# Patient Record
Sex: FEMALE | Race: WHITE | NOT HISPANIC OR LATINO | Employment: UNEMPLOYED | URBAN - METROPOLITAN AREA
[De-identification: names, ages, dates, MRNs, and addresses within clinical notes are randomized per-mention and may not be internally consistent; named-entity substitution may affect disease eponyms.]

---

## 2017-09-20 ENCOUNTER — HOSPITAL ENCOUNTER (EMERGENCY)
Facility: HOSPITAL | Age: 37
Discharge: HOME | End: 2017-09-20
Attending: EMERGENCY MEDICINE
Payer: MEDICAID

## 2017-09-20 VITALS
HEART RATE: 73 BPM | OXYGEN SATURATION: 99 % | BODY MASS INDEX: 24.84 KG/M2 | HEIGHT: 62 IN | WEIGHT: 135 LBS | SYSTOLIC BLOOD PRESSURE: 178 MMHG | RESPIRATION RATE: 18 BRPM | TEMPERATURE: 98 F | DIASTOLIC BLOOD PRESSURE: 112 MMHG

## 2017-09-20 DIAGNOSIS — R06.02 SOB (SHORTNESS OF BREATH): Primary | ICD-10-CM

## 2017-09-20 LAB
ALBUMIN SERPL BCP-MCNC: 3.8 G/DL
ALP SERPL-CCNC: 90 U/L
ALT SERPL W/O P-5'-P-CCNC: 47 U/L
ANION GAP SERPL CALC-SCNC: 12 MMOL/L
AST SERPL-CCNC: 49 U/L
B-HCG UR QL: NEGATIVE
BASOPHILS # BLD AUTO: 0.03 K/UL
BASOPHILS NFR BLD: 0.5 %
BILIRUB SERPL-MCNC: 0.4 MG/DL
BILIRUB UR QL STRIP: NEGATIVE
BNP SERPL-MCNC: 21 PG/ML
BUN SERPL-MCNC: 11 MG/DL
CALCIUM SERPL-MCNC: 9.5 MG/DL
CHLORIDE SERPL-SCNC: 103 MMOL/L
CK SERPL-CCNC: 66 U/L
CLARITY UR: CLEAR
CO2 SERPL-SCNC: 25 MMOL/L
COLOR UR: YELLOW
CREAT SERPL-MCNC: 0.8 MG/DL
D DIMER PPP IA.FEU-MCNC: 0.48 MG/L FEU
DIFFERENTIAL METHOD: ABNORMAL
EOSINOPHIL # BLD AUTO: 0.1 K/UL
EOSINOPHIL NFR BLD: 2.2 %
ERYTHROCYTE [DISTWIDTH] IN BLOOD BY AUTOMATED COUNT: 14.2 %
EST. GFR  (AFRICAN AMERICAN): >60 ML/MIN/1.73 M^2
EST. GFR  (NON AFRICAN AMERICAN): >60 ML/MIN/1.73 M^2
GLUCOSE SERPL-MCNC: 90 MG/DL
GLUCOSE UR QL STRIP: NEGATIVE
HCT VFR BLD AUTO: 37.8 %
HGB BLD-MCNC: 12.2 G/DL
HGB UR QL STRIP: ABNORMAL
KETONES UR QL STRIP: NEGATIVE
LEUKOCYTE ESTERASE UR QL STRIP: NEGATIVE
LYMPHOCYTES # BLD AUTO: 1.7 K/UL
LYMPHOCYTES NFR BLD: 26 %
MCH RBC QN AUTO: 25.2 PG
MCHC RBC AUTO-ENTMCNC: 32.3 G/DL
MCV RBC AUTO: 78 FL
MICROSCOPIC COMMENT: ABNORMAL
MONOCYTES # BLD AUTO: 0.6 K/UL
MONOCYTES NFR BLD: 9.8 %
NEUTROPHILS # BLD AUTO: 4 K/UL
NEUTROPHILS NFR BLD: 61.5 %
NITRITE UR QL STRIP: NEGATIVE
PH UR STRIP: 6 [PH] (ref 5–8)
PLATELET # BLD AUTO: 288 K/UL
PMV BLD AUTO: 9.4 FL
POTASSIUM SERPL-SCNC: 3.8 MMOL/L
PROT SERPL-MCNC: 9 G/DL
PROT UR QL STRIP: NEGATIVE
RBC # BLD AUTO: 4.85 M/UL
RBC #/AREA URNS HPF: 30 /HPF (ref 0–4)
SODIUM SERPL-SCNC: 140 MMOL/L
SP GR UR STRIP: 1.01 (ref 1–1.03)
SQUAMOUS #/AREA URNS HPF: 8 /HPF
TROPONIN I SERPL DL<=0.01 NG/ML-MCNC: 0.02 NG/ML
URN SPEC COLLECT METH UR: ABNORMAL
UROBILINOGEN UR STRIP-ACNC: NEGATIVE EU/DL
WBC # BLD AUTO: 6.51 K/UL

## 2017-09-20 PROCEDURE — 80053 COMPREHEN METABOLIC PANEL: CPT

## 2017-09-20 PROCEDURE — 81000 URINALYSIS NONAUTO W/SCOPE: CPT

## 2017-09-20 PROCEDURE — 85025 COMPLETE CBC W/AUTO DIFF WBC: CPT

## 2017-09-20 PROCEDURE — 81025 URINE PREGNANCY TEST: CPT

## 2017-09-20 PROCEDURE — 82550 ASSAY OF CK (CPK): CPT

## 2017-09-20 PROCEDURE — 93010 ELECTROCARDIOGRAM REPORT: CPT | Mod: ,,, | Performed by: INTERNAL MEDICINE

## 2017-09-20 PROCEDURE — 85379 FIBRIN DEGRADATION QUANT: CPT

## 2017-09-20 PROCEDURE — 83880 ASSAY OF NATRIURETIC PEPTIDE: CPT

## 2017-09-20 PROCEDURE — 84484 ASSAY OF TROPONIN QUANT: CPT

## 2017-09-20 PROCEDURE — 99284 EMERGENCY DEPT VISIT MOD MDM: CPT

## 2018-01-25 ENCOUNTER — NURSE TRIAGE (OUTPATIENT)
Dept: ADMINISTRATIVE | Facility: CLINIC | Age: 38
End: 2018-01-25

## 2018-01-26 ENCOUNTER — HOSPITAL ENCOUNTER (EMERGENCY)
Facility: HOSPITAL | Age: 38
Discharge: HOME | End: 2018-01-26
Attending: EMERGENCY MEDICINE
Payer: MEDICAID

## 2018-01-26 VITALS
TEMPERATURE: 99 F | WEIGHT: 144.75 LBS | DIASTOLIC BLOOD PRESSURE: 74 MMHG | OXYGEN SATURATION: 98 % | HEART RATE: 98 BPM | RESPIRATION RATE: 20 BRPM | BODY MASS INDEX: 25.65 KG/M2 | HEIGHT: 63 IN | SYSTOLIC BLOOD PRESSURE: 138 MMHG

## 2018-01-26 DIAGNOSIS — S16.1XXA STRAIN OF NECK MUSCLE, INITIAL ENCOUNTER: ICD-10-CM

## 2018-01-26 DIAGNOSIS — M54.12 CERVICAL RADICULOPATHY: Primary | ICD-10-CM

## 2018-01-26 DIAGNOSIS — M54.2 NECK PAIN: ICD-10-CM

## 2018-01-26 PROCEDURE — 96372 THER/PROPH/DIAG INJ SC/IM: CPT

## 2018-01-26 PROCEDURE — 99284 EMERGENCY DEPT VISIT MOD MDM: CPT | Mod: 25

## 2018-01-26 PROCEDURE — 63600175 PHARM REV CODE 636 W HCPCS: Performed by: NURSE PRACTITIONER

## 2018-01-26 RX ORDER — DICLOFENAC SODIUM 50 MG/1
50 TABLET, DELAYED RELEASE ORAL 3 TIMES DAILY PRN
Qty: 20 TABLET | Refills: 0 | Status: SHIPPED | OUTPATIENT
Start: 2018-01-26

## 2018-01-26 RX ORDER — TIZANIDINE 4 MG/1
4 TABLET ORAL EVERY 6 HOURS PRN
COMMUNITY

## 2018-01-26 RX ORDER — KETOROLAC TROMETHAMINE 30 MG/ML
60 INJECTION, SOLUTION INTRAMUSCULAR; INTRAVENOUS
Status: COMPLETED | OUTPATIENT
Start: 2018-01-26 | End: 2018-01-26

## 2018-01-26 RX ORDER — ORPHENADRINE CITRATE 30 MG/ML
60 INJECTION INTRAMUSCULAR; INTRAVENOUS
Status: COMPLETED | OUTPATIENT
Start: 2018-01-26 | End: 2018-01-26

## 2018-01-26 RX ORDER — METHYLPREDNISOLONE 4 MG/1
4 TABLET ORAL
COMMUNITY

## 2018-01-26 RX ORDER — ORPHENADRINE CITRATE 100 MG/1
100 TABLET, EXTENDED RELEASE ORAL 2 TIMES DAILY
Qty: 20 TABLET | Refills: 0 | Status: SHIPPED | OUTPATIENT
Start: 2018-01-26 | End: 2018-02-05

## 2018-01-26 RX ORDER — AMLODIPINE BESYLATE 10 MG/1
10 TABLET ORAL DAILY
COMMUNITY

## 2018-01-26 RX ADMIN — ORPHENADRINE CITRATE 60 MG: 30 INJECTION INTRAMUSCULAR; INTRAVENOUS at 01:01

## 2018-01-26 RX ADMIN — KETOROLAC TROMETHAMINE 60 MG: 60 INJECTION, SOLUTION INTRAMUSCULAR at 01:01

## 2018-01-26 NOTE — ED PROVIDER NOTES
HISTORY     Chief Complaint   Patient presents with    Neck Pain     pt reports neck injury about a week ago and now having some numbness to R arm     Review of patient's allergies indicates:   Allergen Reactions    Pcn [penicillins] Rash        HPI   The history is provided by the patient.   Neck Pain    This is a new problem. The current episode started several weeks ago. The problem occurs throughout the day. The problem has been waxing and waning. The pain is associated with a recent injury. The pain is present in the generalized neck. The quality of the pain is described as aching. The pain radiates to the right arm. The pain is at a severity of 7/10. The symptoms are aggravated by bending, twisting and position. Associated symptoms include numbness and tingling. Pertinent negatives include no chest pain, no syncope, no paresis and no weakness. She has tried nothing for the symptoms. The treatment provided no relief.        PCP: Primary Doctor No     Past Medical History:  Past Medical History:   Diagnosis Date    Hypertension         Past Surgical History:  Past Surgical History:   Procedure Laterality Date     SECTION      CHOLECYSTECTOMY      sinus      TONSILLECTOMY          Family History:  No family history on file.     Social History:  Social History     Social History Main Topics    Smoking status: Current Every Day Smoker     Packs/day: 0.50     Types: Cigarettes    Smokeless tobacco: Never Used    Alcohol use 1.2 oz/week     2 Cans of beer per week      Comment: daily    Drug use: Yes     Types: IV      Comment: heroin- yesterday last use    Sexual activity: Yes     Partners: Male         ROS   Review of Systems   Constitutional: Negative for fever.   HENT: Negative for sore throat.    Respiratory: Negative for shortness of breath.    Cardiovascular: Negative for chest pain and syncope.   Gastrointestinal: Negative for nausea.   Genitourinary: Negative for dysuria.  "  Musculoskeletal: Positive for neck pain. Negative for back pain.   Skin: Negative for rash.   Neurological: Positive for tingling and numbness. Negative for weakness.   Hematological: Does not bruise/bleed easily.       PHYSICAL EXAM     Initial Vitals [01/26/18 0101]   BP Pulse Resp Temp SpO2   (!) 150/77 98 20 98.3 °F (36.8 °C) 97 %      MAP       101.33           Physical Exam    Constitutional: She appears well-developed and well-nourished. No distress.   HENT:   Head: Normocephalic and atraumatic.   Eyes: Conjunctivae are normal. Pupils are equal, round, and reactive to light.   Neck: Normal range of motion. Neck supple. Muscular tenderness present.       Cardiovascular: Normal rate, regular rhythm and normal heart sounds.   Pulmonary/Chest: Breath sounds normal.   Abdominal: Soft. Bowel sounds are normal. She exhibits no distension. There is no tenderness. There is no rebound.   Musculoskeletal: Normal range of motion. She exhibits no edema.   Neurological: She is alert and oriented to person, place, and time. She has normal strength.   Skin: Skin is warm and dry.   Psychiatric: She has a normal mood and affect.          ED COURSE   Procedures  ED ONGOING VITALS:  Vitals:    01/26/18 0101 01/26/18 0223   BP: (!) 150/77 138/74   Pulse: 98 98   Resp: 20    Temp: 98.3 °F (36.8 °C) 99.3 °F (37.4 °C)   TempSrc: Oral    SpO2: 97% 98%   Weight: 65.6 kg (144 lb 11.7 oz)    Height: 5' 3" (1.6 m)          ABNORMAL LAB VALUES:  Labs Reviewed - No data to display      ALL LAB VALUES:        RADIOLOGY STUDIES:  Imaging Results          CT Cervical Spine Without Contrast (Final result)  Result time 01/26/18 08:26:41    Final result by Phillip Gómez MD (01/26/18 08:26:41)                 Impression:         No acute osseous findings noted involving the cervical spine.  Multilevel degenerative disc disease with multilevel spinal canal narrowing.  Nonspecific straightening of the cervical lordosis.    All CT scans at this " facility use dose modulation, iterative reconstruction, and/or weight based dosing when appropriate to reduce radiation dose to as low as reasonably achievable.      Electronically signed by: LEMUEL ALEGRE MD  Date:     01/26/18  Time:    08:26              Narrative:    Exam: CT scan of the cervical spine without contrast    History:     Neck pain.  Trauma, initial encountered.    Findings: No fracture or acute bony abnormality is identified.  Posterior disc bulging and osteophytes greatest at C3-C4, C4-C5, C5-C6 and C6-C7 with mild canal narrowing most pronounced at C4-C5 and C5-C6.  Arthritic changes producing foraminal narrowing at C4-C5, C5-C6, C6-C7 and on the left at C7-T1.  There is straightening of normal cervical lordosis.                                  cervical straightening which may be secondary to positioning versus spasm. Cervical spondylosis seen with varying degrees of spinal cord indentation and neural foraminal stenosis       The above vital signs and test results have been reviewed by the emergency provider.     ED Medications:  Medications   ketorolac injection 60 mg (60 mg Intramuscular Given 1/26/18 0148)   orphenadrine injection 60 mg (60 mg Intramuscular Given 1/26/18 0148)       Discharge Medications:  Discharge Medication List as of 1/26/2018  2:14 AM      START taking these medications    Details   diclofenac (VOLTAREN) 50 MG EC tablet Take 1 tablet (50 mg total) by mouth 3 (three) times daily as needed., Starting Fri 1/26/2018, Print      orphenadrine (NORFLEX) 100 mg tablet Take 1 tablet (100 mg total) by mouth 2 (two) times daily., Starting Fri 1/26/2018, Until Mon 2/5/2018, Print               I discussed with patient and/or family/caretaker that evaluation in the ED does not suggest any emergent or life threatening medical conditions requiring immediate intervention beyond what was provided in the ED, and I believe patient is safe for discharge. Regardless, an unremarkable evaluation  in the ED does not preclude the development or presence of a serious or life threatening condition. As such, patient was instructed to return immediately for any worsening or change in current symptoms.        MEDICAL DECISION MAKING                 CLINICAL IMPRESSION       ICD-10-CM ICD-9-CM   1. Cervical radiculopathy M54.12 723.4   2. Strain of neck muscle, initial encounter S16.1XXA 847.0   3. Neck pain M54.2 723.1       Disposition:   Disposition: Discharged  Condition: Stable         Jonh Jennings NP  01/28/18 1705       Jonh Jennings NP  01/28/18 1705

## 2018-01-26 NOTE — TELEPHONE ENCOUNTER
"  Reason for Disposition   [1] MODERATE leg swelling (e.g., swelling extends up to knees) AND [2] new onset or worsening   [1] Numbness (i.e., loss of sensation) of the face, arm / hand, or leg / foot on one side of the body AND [2] gradual onset (e.g., days to weeks) AND [3] present now    Answer Assessment - Initial Assessment Questions  1. SYMPTOM: "What is the main symptom you are concerned about?" (e.g., weakness, numbness)      Numbness /weakness in arm   2. ONSET: "When did this start?" (minutes, hours, days; while sleeping)      About a week ago  Pt seen at [facility] about 2 wks ago for trauma to neck/shoulders/back. Had pulled/torn muscles in neck/back. About a week after being seen noted numbness in right arm with weakness- cannot hold a glass and has to use left arm to help move the right. Also concerned about bilateral swelling to feet and legs up to knees. Legs are red/painful to touch- swelling resolves by am but within a short time swelling returns. Has no pcp - wants advice    Protocols used: ST LEG SWELLING AND EDEMA-A-AH, ST NEUROLOGIC DEFICIT-A-AH    "

## 2020-02-28 NOTE — ED PROVIDER NOTES
"SCRIBE #1 NOTE: I, Robert Membreno, am scribing for, and in the presence of, Vinay Blankenship MD. I have scribed the entire note.      History      Chief Complaint   Patient presents with    Shortness of Breath     pt states "I feel like I can't catch my breath." reports cough too       Review of patient's allergies indicates:   Allergen Reactions    Pcn [penicillins] Rash        HPI   HPI    9/20/2017, 2:26 PM   History obtained from the patient      History of Present Illness: Pari Koehler is a 37 y.o. female patient who presents to the Emergency Department for SOB which onset gradually 2 weeks ago. Symptoms are intermittent and moderate in severity. Sx are exacerbated by nothing and relieved by nothing. Associated sxs include nonproductive cough. Pt states "it feels like I can't take a deep breath and something is sitting in my chest." No other axs reported. Patient denies any fever, N/V/D, chills, palpitations, leg swelling, CP, chest tightness, lightheadedness, dizziness, congestion, rhinorrhea, abd pain and all other sxs at this time. No further complaints or concerns at this time.     Arrival mode: Personal vehicle    PCP: Primary Doctor No       Past Medical History:  No past medical history on file.    Past Surgical History:  No past surgical history on file.      Family History:  No family history on file.    Social History:  Social History     Social History Main Topics    Smoking status: Current Every Day Smoker     Packs/day: 1.00    Smokeless tobacco: Not on file    Alcohol use No    Drug use:      Types: IV      Comment: heroin    Sexual activity: Not on file       ROS   Review of Systems   Constitutional: Negative for chills and fever.   HENT: Negative for congestion and sore throat.    Respiratory: Positive for shortness of breath. Negative for cough and chest tightness.    Cardiovascular: Negative for chest pain.   Gastrointestinal: Negative for abdominal pain, nausea and vomiting. " What Type Of Note Output Would You Prefer (Optional)?: Bullet Format "  Genitourinary: Negative for difficulty urinating and dysuria.   Musculoskeletal: Negative for back pain and neck pain.   Skin: Negative for rash.   Neurological: Negative for dizziness, weakness, light-headedness, numbness and headaches.   Psychiatric/Behavioral: Negative for agitation and confusion.   All other systems reviewed and are negative.      Physical Exam      Initial Vitals [09/20/17 1409]   BP Pulse Resp Temp SpO2   (!) 178/112 73 18 98 °F (36.7 °C) 99 %      MAP       134          Physical Exam  Nursing Notes and Vital Signs Reviewed.  Constitutional: Patient is in no apparent distress. Well-developed and well-nourished.  Head: Atraumatic. Normocephalic.  Eyes: PERRL. EOM intact. Conjunctivae are not pale. No scleral icterus.  ENT: Mucous membranes are moist. Oropharynx is clear and symmetric.    Neck: Supple. Full ROM. No lymphadenopathy.  Cardiovascular: Regular rate. Regular rhythm. No murmurs, rubs, or gallops. Distal pulses are 2+ and symmetric.  Pulmonary/Chest: No respiratory distress. Clear to auscultation bilaterally. No wheezing, rales, or rhonchi.  Abdominal: Soft and non-distended.  There is no tenderness.  No rebound, guarding, or rigidity. Good bowel sounds.  Musculoskeletal: Moves all extremities. No obvious deformities. No edema. No calf tenderness.  Skin: Warm and dry.  Neurological:  Alert, awake, and appropriate.  Normal speech.  No acute focal neurological deficits are appreciated.  Psychiatric: Normal affect. Good eye contact. Appropriate in content.    ED Course    Procedures  ED Vital Signs:  Vitals:    09/20/17 1409   BP: (!) 178/112   Pulse: 73   Resp: 18   Temp: 98 °F (36.7 °C)   TempSrc: Oral   SpO2: 99%   Weight: 61.2 kg (135 lb)   Height: 5' 2" (1.575 m)       Abnormal Lab Results:  Labs Reviewed   CBC W/ AUTO DIFFERENTIAL - Abnormal; Notable for the following:        Result Value    MCV 78 (*)     MCH 25.2 (*)     All other components within normal limits   COMPREHENSIVE " What Is The Reason For Today's Visit?: Full Body Skin Examination with No Concerns METABOLIC PANEL - Abnormal; Notable for the following:     Total Protein 9.0 (*)     AST 49 (*)     ALT 47 (*)     All other components within normal limits   URINALYSIS - Abnormal; Notable for the following:     Occult Blood UA 3+ (*)     All other components within normal limits   URINALYSIS MICROSCOPIC - Abnormal; Notable for the following:     RBC, UA 30 (*)     All other components within normal limits   PREGNANCY TEST, URINE RAPID   B-TYPE NATRIURETIC PEPTIDE   CK   TROPONIN I   D DIMER, QUANTITATIVE        All Lab Results:  Results for orders placed or performed during the hospital encounter of 09/20/17   CBC auto differential   Result Value Ref Range    WBC 6.51 3.90 - 12.70 K/uL    RBC 4.85 4.00 - 5.40 M/uL    Hemoglobin 12.2 12.0 - 16.0 g/dL    Hematocrit 37.8 37.0 - 48.5 %    MCV 78 (L) 82 - 98 fL    MCH 25.2 (L) 27.0 - 31.0 pg    MCHC 32.3 32.0 - 36.0 g/dL    RDW 14.2 11.5 - 14.5 %    Platelets 288 150 - 350 K/uL    MPV 9.4 9.2 - 12.9 fL    Gran # 4.0 1.8 - 7.7 K/uL    Lymph # 1.7 1.0 - 4.8 K/uL    Mono # 0.6 0.3 - 1.0 K/uL    Eos # 0.1 0.0 - 0.5 K/uL    Baso # 0.03 0.00 - 0.20 K/uL    Gran% 61.5 38.0 - 73.0 %    Lymph% 26.0 18.0 - 48.0 %    Mono% 9.8 4.0 - 15.0 %    Eosinophil% 2.2 0.0 - 8.0 %    Basophil% 0.5 0.0 - 1.9 %    Differential Method Automated    Comprehensive metabolic panel   Result Value Ref Range    Sodium 140 136 - 145 mmol/L    Potassium 3.8 3.5 - 5.1 mmol/L    Chloride 103 95 - 110 mmol/L    CO2 25 23 - 29 mmol/L    Glucose 90 70 - 110 mg/dL    BUN, Bld 11 6 - 20 mg/dL    Creatinine 0.8 0.5 - 1.4 mg/dL    Calcium 9.5 8.7 - 10.5 mg/dL    Total Protein 9.0 (H) 6.0 - 8.4 g/dL    Albumin 3.8 3.5 - 5.2 g/dL    Total Bilirubin 0.4 0.1 - 1.0 mg/dL    Alkaline Phosphatase 90 55 - 135 U/L    AST 49 (H) 10 - 40 U/L    ALT 47 (H) 10 - 44 U/L    Anion Gap 12 8 - 16 mmol/L    eGFR if African American >60 >60 mL/min/1.73 m^2    eGFR if non African American >60 >60 mL/min/1.73 m^2   Urinalysis  What Is The Reason For Today's Visit? (Being Monitored For X): concerning skin lesions on an annual basis   Result Value Ref Range    Specimen UA Urine, Clean Catch     Color, UA Yellow Yellow, Straw, Astrid    Appearance, UA Clear Clear    pH, UA 6.0 5.0 - 8.0    Specific Gravity, UA 1.015 1.005 - 1.030    Protein, UA Negative Negative    Glucose, UA Negative Negative    Ketones, UA Negative Negative    Bilirubin (UA) Negative Negative    Occult Blood UA 3+ (A) Negative    Nitrite, UA Negative Negative    Urobilinogen, UA Negative <2.0 EU/dL    Leukocytes, UA Negative Negative   Pregnancy, urine rapid   Result Value Ref Range    Preg Test, Ur Negative    Brain natriuretic peptide   Result Value Ref Range    BNP 21 0 - 99 pg/mL   CK   Result Value Ref Range    CPK 66 20 - 180 U/L   Troponin I   Result Value Ref Range    Troponin I 0.018 0.000 - 0.026 ng/mL   Urinalysis Microscopic   Result Value Ref Range    RBC, UA 30 (H) 0 - 4 /hpf    Squam Epithel, UA 8 /hpf    Microscopic Comment SEE COMMENT    D dimer, quantitative   Result Value Ref Range    D-Dimer 0.48 <0.50 mg/L FEU         Imaging Results:  Imaging Results          X-Ray Chest PA And Lateral (Final result)  Result time 09/20/17 14:44:01    Final result by Emerson Branch MD (09/20/17 14:44:01)                 Impression:         Negative two-view chest x-ray.      Electronically signed by: EMERSON BRANCH MD  Date:     09/20/17  Time:    14:44              Narrative:    XR CHEST PA AND LATERAL, 09/20/17 14:37:00    Clinical indication: sob     Findings:   Heart size is normal. The lung fields are clear. No acute pulmonary infiltrate.                               The EKG was ordered, reviewed, and independently interpreted by the ED provider.  Interpretation time: 1503  Rate: 80 BPM  Rhythm: normal sinus rhythm  Interpretation: Normal EKG. No STEMI.           The Emergency Provider reviewed the vital signs and test results, which are outlined above.    ED Discussion     4:04 PM: Reassessed pt at this time.  Pt states her condition has improved at this  time and she is feeling better. Pt is laying comfortably in ED bed and in NAD. Pt is awake, alert, and oriented. Discussed with pt all pertinent ED information and results. Discussed pt dx and plan of tx. Gave pt all f/u and return to the ED instructions. All questions and concerns were addressed at this time. Pt expresses understanding of information and instructions, and is comfortable with plan to discharge. Pt is stable for discharge.    I discussed with patient and/or family/caretaker that evaluation in the ED does not suggest any emergent or life threatening medical conditions requiring immediate intervention beyond what was provided in the ED, and I believe patient is safe for discharge.  Regardless, an unremarkable evaluation in the ED does not preclude the development or presence of a serious of life threatening condition. As such, patient was instructed to return immediately for any worsening or change in current symptoms.      ED Medication(s):  Medications - No data to display    New Prescriptions    No medications on file       Follow-up Information     [facility] in 2 days.    Contact information:  [address] [facility] [address] [phone]                     Medical Decision Making    Medical Decision Making:   Clinical Tests:   Lab Tests: Ordered and Reviewed  Radiological Study: Reviewed and Ordered  Medical Tests: Ordered and Reviewed           Scribe Attestation:   Scribe #1: I performed the above scribed service and the documentation accurately describes the services I performed. I attest to the accuracy of the note.    Attending:   Physician Attestation Statement for Scribe #1: I, Vinay Blankenship MD, personally performed the services described in this documentation, as scribed by Robert Membreno, in my presence, and it is both accurate and complete.          Clinical Impression       ICD-10-CM ICD-9-CM   1. SOB (shortness of breath) R06.02 786.05       Disposition:    Disposition: Discharged  Condition: Stable         Vinay Blankenship MD  09/20/17 0762

## 2022-10-11 ENCOUNTER — OFFICE (OUTPATIENT)
Dept: URBAN - METROPOLITAN AREA CLINIC 10 | Facility: CLINIC | Age: 42
End: 2022-10-11

## 2023-02-28 ENCOUNTER — OFFICE (OUTPATIENT)
Dept: URBAN - METROPOLITAN AREA CLINIC 10 | Facility: CLINIC | Age: 43
End: 2023-02-28

## 2023-05-03 ENCOUNTER — OFFICE (OUTPATIENT)
Dept: URBAN - METROPOLITAN AREA CLINIC 10 | Facility: CLINIC | Age: 43
End: 2023-05-03